# Patient Record
Sex: MALE | Race: WHITE | NOT HISPANIC OR LATINO
[De-identification: names, ages, dates, MRNs, and addresses within clinical notes are randomized per-mention and may not be internally consistent; named-entity substitution may affect disease eponyms.]

---

## 2021-01-26 ENCOUNTER — NON-APPOINTMENT (OUTPATIENT)
Age: 36
End: 2021-01-26

## 2021-01-26 PROBLEM — Z00.00 ENCOUNTER FOR PREVENTIVE HEALTH EXAMINATION: Status: ACTIVE | Noted: 2021-01-26

## 2021-01-28 ENCOUNTER — APPOINTMENT (OUTPATIENT)
Dept: CARDIOLOGY | Facility: CLINIC | Age: 36
End: 2021-01-28
Payer: COMMERCIAL

## 2021-01-28 ENCOUNTER — APPOINTMENT (OUTPATIENT)
Dept: CARDIOLOGY | Facility: CLINIC | Age: 36
End: 2021-01-28

## 2021-01-28 DIAGNOSIS — E78.5 HYPERLIPIDEMIA, UNSPECIFIED: ICD-10-CM

## 2021-01-28 DIAGNOSIS — Z78.9 OTHER SPECIFIED HEALTH STATUS: ICD-10-CM

## 2021-01-28 DIAGNOSIS — I10 ESSENTIAL (PRIMARY) HYPERTENSION: ICD-10-CM

## 2021-01-28 PROCEDURE — 99204 OFFICE O/P NEW MOD 45 MIN: CPT | Mod: 95

## 2021-01-28 RX ORDER — LOSARTAN POTASSIUM AND HYDROCHLOROTHIAZIDE 100; 25 MG/1; MG/1
100-25 TABLET, FILM COATED ORAL
Refills: 0 | Status: ACTIVE | COMMUNITY

## 2021-01-28 NOTE — REASON FOR VISIT
[FreeTextEntry3] : RADHA CERDA  is being seen  for an initial consultation at the Cardiogenomics Program at [facility] on 01/28/2021.   Mr. CERDA was referred for hereditary cardiac predisposition risk assessment and counseling, due to his family history of familial hyperlipidemia due to a LDLR mutation \par \par

## 2021-01-28 NOTE — HISTORY OF PRESENT ILLNESS
[FreeTextEntry1] : Mr. RADHA CERDA  is a 35 year-old man seen on 01/28/2021\par PMH HLD, HTN\par He was first diagnosed with HLD at age 24yo, Tchol at that time 236, trig 225, HDL 37 and \par he states he was 30-40 lbs heavier at that time\par he has been controlling his lipids with diet\par 10/2020: Tchol 248, trig 190, HLD 41, VLDL 35, \par \par today he is referred for a cardiogenomic evaluation\par \par

## 2021-01-28 NOTE — FAMILY HISTORY
[FreeTextEntry1] : --\par A four-generation family history was constructed and scanned into [employer]. \par Family history is significant for: \par siblings:\par sister 36 yo hx FH + LDLR mutation (Deletion including exon 1 of the LDLR gene [[id number]]. Genomic coordinates: arr[GRCh37] 19p13.2([id number])x1)-genedx accession nbr  [id number]\par -no children, \par sister 38 yo diagnosed HLD at age 7, only on one blood test, placed on a diet and HLD improved however patient feels the vials of her and her sister were switched\par \par Mother 64 yo HLD on statin\par Maternal aunts x 3 \par Maternal uncles x6 , one dec 60s CA, others in 70s, one with parkinsons\par Maternal Grandmother dec hx CVA 78, hx heart disease, \par Maternal Grandfather dec 81\par \par Father 65yo Hx HLD diag in his 30s, placed on medications in his 30s, takes 80mg lipitor, and 10mg zetia\par Paternal aunts none\par Paternal uncles x3: 65yo hx HLD on medication simvastatin 40mg, one uncle dec ALS early 50s, also had Hx HLD (one son and one daughter 30 and 29yo no HLD), one uncle late 50s hx HLD on repatha, Hx MI and CABG in his 40s\par Paternal Grandfather dec 90 no med probs\par Paternal Grandmother dec 63 yo massive CVA , hx of rheumatic fever as a child, had a heart valve replacement and CVA in her early 50s, smoker\par \par children: none\par \par her maternal families originate from [place], [place],  and paternal families [place]\par No Ashkenazi Confucianist ancestry. \par Family history was positive/negative for consanguinity \par No family history of SIDS\par  \par

## 2021-01-29 ENCOUNTER — TRANSCRIPTION ENCOUNTER (OUTPATIENT)
Age: 36
End: 2021-01-29

## 2021-06-02 ENCOUNTER — APPOINTMENT (OUTPATIENT)
Dept: CARDIOLOGY | Facility: CLINIC | Age: 36
End: 2021-06-02
Payer: COMMERCIAL

## 2021-06-02 PROCEDURE — 99213 OFFICE O/P EST LOW 20 MIN: CPT | Mod: 95

## 2021-06-02 NOTE — FAMILY HISTORY
[FreeTextEntry1] : --\par A four-generation family history was constructed and scanned into [employer]. \par Family history is significant for: \par siblings:\par sister 36 yo hx FH + LDLR mutation (Deletion including exon 1 of the LDLR gene [[id number]]. Genomic coordinates: arr[GRCh37] 19p13.2([id number])x1)-genedx accession nbr  [id number]\par -no children, \par sister 38 yo diagnosed HLD at age 7, only on one blood test, placed on a diet and HLD improved however patient feels the vials of her and her sister were switched\par \par Mother 66 yo HLD on statin\par Maternal aunts x 3 \par Maternal uncles x6 , one dec 60s CA, others in 70s, one with parkinsons\par Maternal Grandmother dec hx CVA 78, hx heart disease, \par Maternal Grandfather dec 81\par \par Father 65yo Hx HLD diag in his 30s, placed on medications in his 30s, takes 80mg lipitor, and 10mg zetia\par Paternal aunts none\par Paternal uncles x3: 65yo hx HLD on medication simvastatin 40mg, one uncle dec ALS early 50s, also had Hx HLD (one son and one daughter 30 and 29yo no HLD), one uncle late 50s hx HLD on repatha, Hx MI and CABG in his 40s\par Paternal Grandfather dec 90 no med probs\par Paternal Grandmother dec 61 yo massive CVA , hx of rheumatic fever as a child, had a heart valve replacement and CVA in her early 50s, smoker\par \par children: none\par \par her maternal families originate from [place], [place],  and paternal families originate from [place] [facility]\par No Ashkenazi Taoist ancestry. \par Family history was positive/negative for consanguinity \par No family history of SIDS\par  \par

## 2021-06-02 NOTE — REASON FOR VISIT
[FreeTextEntry3] : RADHA CERDA was seen  for an initial consultation at the Cardiogenomics Program at [facility] on 01/28/2021.   Mr. CERDA was referred for hereditary cardiac predisposition risk assessment and counseling, due to his family history of familial hyperlipidemia due to a LDLR mutation \par \par

## 2021-06-02 NOTE — HISTORY OF PRESENT ILLNESS
[Home] : at home, [unfilled] , at the time of the visit. [Medical Office: ([facility])___] : at the medical office located in  [Verbal consent obtained from patient] : the patient, [unfilled] [FreeTextEntry1] : Mr. RADHA CERDA  is a 35 year-old man seen on 01/28/2021\par PMH HLD, HTN\par He was first diagnosed with HLD at age 26yo, Tchol at that time 236, trig 225, HDL 37 and \par he states he was 30-40 lbs heavier at that time\par he has been controlling his lipids with diet\par 10/2020: Tchol 248, trig 190, HLD 41, VLDL 35, \par \par he underwent genetic testing and today presents for results\par \par